# Patient Record
(demographics unavailable — no encounter records)

---

## 2024-12-02 NOTE — HISTORY OF PRESENT ILLNESS
[FreeTextEntry1] : 31 year old M, no PMH and PSH of left orchiectomy for stage 1 testicular cancer and had a prosthesis placed 2/29/24. Pathology: Stage 1 seminoma No hx of radiation or chemo.  He is on jatenzo for low T He also takes cialis prn for ED  11/2024: Testosterone still low 208.   Reviewed abd/pel CT on 8/27/2024: single borderline lymph node adjacent to left renal vein. NO other findings suggesting metastasis.

## 2024-12-02 NOTE — ASSESSMENT
[FreeTextEntry1] : Testicular CA Seminoma stage 1 hx 2/2024: Reviewed abd/pel CT on 8/27/2024: single borderline lymph node adjacent to left renal vein. NO other findings suggesting metastasis.  plan for repeat CT in 6 months advised to establish care with oncologist  Low T: will switch over to xyosted f/u 2 months  ED: use cialis 5mg (1/2 of 10mg) prn

## 2025-03-11 NOTE — ASSESSMENT
[FreeTextEntry1] : Testicular CA Seminoma stage 1 hx 2/2024: plan for repeat CT and xray in 6 months -script provided will check tumor markers physical exam every 6 months  Low T: c/w xyosted f/u with bloodwork in 3 months  ED: use cialis 5mg (1/2 of 10mg) prn

## 2025-03-11 NOTE — PHYSICAL EXAM
[Normal Appearance] : normal appearance [Well Groomed] : well groomed [General Appearance - In No Acute Distress] : no acute distress [Edema] : no peripheral edema [Respiration, Rhythm And Depth] : normal respiratory rhythm and effort [Normal Station and Gait] : the gait and station were normal for the patient's age [] : no rash [No Focal Deficits] : no focal deficits [Oriented To Time, Place, And Person] : oriented to person, place, and time [Affect] : the affect was normal [Mood] : the mood was normal [Testes Mass (___cm)] : there were no testicular masses [de-identified] : left testicle prosthesis. no palpable lymphadenopathy

## 2025-03-11 NOTE — HISTORY OF PRESENT ILLNESS
[FreeTextEntry1] : 31 year old M, no PMH and PSH of left orchiectomy for stage 1 testicular cancer and had a prosthesis placed 2/29/24. Pathology: Stage 1 seminoma No hx of radiation or chemo.  He takes xyosted weekly He also takes cialis prn for ED  Testosterone 372 total on 2/8/25 Hematocrit 50.8 on 2/8/25  abd/pel CT on 8/27/2024: single borderline lymph node adjacent to left renal vein. NO other findings suggesting metastasis.  CT 2/28.2025: no acute disease

## 2025-06-09 NOTE — ASSESSMENT
[FreeTextEntry1] : Testicular CA Seminoma stage 1 hx 2/2024: normal tumor marker levels 6/2025 scirpt for CT given last visit  Low T: c/w xyosted pt reports his insurance may not cover Xyosted from July  previously failed jatenzo  ED: use cialis 5mg (1/2 of 10mg) prn

## 2025-06-09 NOTE — HISTORY OF PRESENT ILLNESS
[FreeTextEntry1] : 31 year old M, no PMH and PSH of left orchiectomy for stage 1 testicular cancer and had a prosthesis placed 2/29/24. Pathology: Stage 1 seminoma No hx of radiation or chemo.  He takes xyosted weekly He also takes cialis prn for ED  Testosterone 534 on 6/2025 Hct 48.6 on 6/2025 PSA 1.0 on 6/2025  abd/pel CT on 8/27/2024: single borderline lymph node adjacent to left renal vein. NO other findings suggesting metastasis.  CT 2/28.2025: no acute disease